# Patient Record
Sex: FEMALE | Race: WHITE | NOT HISPANIC OR LATINO | URBAN - METROPOLITAN AREA
[De-identification: names, ages, dates, MRNs, and addresses within clinical notes are randomized per-mention and may not be internally consistent; named-entity substitution may affect disease eponyms.]

---

## 2018-07-05 ENCOUNTER — EMERGENCY (EMERGENCY)
Facility: HOSPITAL | Age: 23
LOS: 0 days | Discharge: HOME | End: 2018-07-06
Attending: EMERGENCY MEDICINE | Admitting: EMERGENCY MEDICINE

## 2018-07-05 VITALS
RESPIRATION RATE: 18 BRPM | HEART RATE: 111 BPM | DIASTOLIC BLOOD PRESSURE: 78 MMHG | OXYGEN SATURATION: 99 % | TEMPERATURE: 99 F | SYSTOLIC BLOOD PRESSURE: 130 MMHG

## 2018-07-05 DIAGNOSIS — O26.899 OTHER SPECIFIED PREGNANCY RELATED CONDITIONS, UNSPECIFIED TRIMESTER: ICD-10-CM

## 2018-07-05 DIAGNOSIS — R10.31 RIGHT LOWER QUADRANT PAIN: ICD-10-CM

## 2018-07-05 DIAGNOSIS — R10.9 UNSPECIFIED ABDOMINAL PAIN: ICD-10-CM

## 2018-07-05 DIAGNOSIS — Z3A.00 WEEKS OF GESTATION OF PREGNANCY NOT SPECIFIED: ICD-10-CM

## 2018-07-05 DIAGNOSIS — N83.8 OTHER NONINFLAMMATORY DISORDERS OF OVARY, FALLOPIAN TUBE AND BROAD LIGAMENT: ICD-10-CM

## 2018-07-05 LAB
ALBUMIN SERPL ELPH-MCNC: 5.1 G/DL — SIGNIFICANT CHANGE UP (ref 3.5–5.2)
ALP SERPL-CCNC: 52 U/L — SIGNIFICANT CHANGE UP (ref 30–115)
ALT FLD-CCNC: 9 U/L — SIGNIFICANT CHANGE UP (ref 0–41)
ANION GAP SERPL CALC-SCNC: 18 MMOL/L — HIGH (ref 7–14)
APPEARANCE UR: (no result)
APTT BLD: 30.2 SEC — SIGNIFICANT CHANGE UP (ref 27–39.2)
AST SERPL-CCNC: 14 U/L — SIGNIFICANT CHANGE UP (ref 0–41)
BACTERIA # UR AUTO: (no result) /HPF
BASOPHILS # BLD AUTO: 0.03 K/UL — SIGNIFICANT CHANGE UP (ref 0–0.2)
BASOPHILS NFR BLD AUTO: 0.2 % — SIGNIFICANT CHANGE UP (ref 0–1)
BILIRUB SERPL-MCNC: 1 MG/DL — SIGNIFICANT CHANGE UP (ref 0.2–1.2)
BILIRUB UR-MCNC: NEGATIVE — SIGNIFICANT CHANGE UP
BLD GP AB SCN SERPL QL: SIGNIFICANT CHANGE UP
BUN SERPL-MCNC: 14 MG/DL — SIGNIFICANT CHANGE UP (ref 10–20)
CALCIUM SERPL-MCNC: 9.9 MG/DL — SIGNIFICANT CHANGE UP (ref 8.5–10.1)
CHLORIDE SERPL-SCNC: 102 MMOL/L — SIGNIFICANT CHANGE UP (ref 98–110)
CO2 SERPL-SCNC: 22 MMOL/L — SIGNIFICANT CHANGE UP (ref 17–32)
COLOR SPEC: YELLOW — SIGNIFICANT CHANGE UP
CREAT SERPL-MCNC: 0.6 MG/DL — LOW (ref 0.7–1.5)
DIFF PNL FLD: (no result)
EOSINOPHIL # BLD AUTO: 0.01 K/UL — SIGNIFICANT CHANGE UP (ref 0–0.7)
EOSINOPHIL NFR BLD AUTO: 0.1 % — SIGNIFICANT CHANGE UP (ref 0–8)
EPI CELLS # UR: (no result) /HPF
GLUCOSE SERPL-MCNC: 103 MG/DL — HIGH (ref 70–99)
GLUCOSE UR QL: NEGATIVE MG/DL — SIGNIFICANT CHANGE UP
HCG SERPL-ACNC: 578.7 MIU/ML — HIGH
HCT VFR BLD CALC: 37.2 % — SIGNIFICANT CHANGE UP (ref 37–47)
HGB BLD-MCNC: 12.3 G/DL — SIGNIFICANT CHANGE UP (ref 12–16)
IMM GRANULOCYTES NFR BLD AUTO: 0.4 % — HIGH (ref 0.1–0.3)
INR BLD: 1.28 RATIO — SIGNIFICANT CHANGE UP (ref 0.65–1.3)
KETONES UR-MCNC: 15
LEUKOCYTE ESTERASE UR-ACNC: (no result)
LYMPHOCYTES # BLD AUTO: 1.29 K/UL — SIGNIFICANT CHANGE UP (ref 1.2–3.4)
LYMPHOCYTES # BLD AUTO: 10.4 % — LOW (ref 20.5–51.1)
MCHC RBC-ENTMCNC: 27.8 PG — SIGNIFICANT CHANGE UP (ref 27–31)
MCHC RBC-ENTMCNC: 33.1 G/DL — SIGNIFICANT CHANGE UP (ref 32–37)
MCV RBC AUTO: 84 FL — SIGNIFICANT CHANGE UP (ref 81–99)
MONOCYTES # BLD AUTO: 0.86 K/UL — HIGH (ref 0.1–0.6)
MONOCYTES NFR BLD AUTO: 6.9 % — SIGNIFICANT CHANGE UP (ref 1.7–9.3)
NEUTROPHILS # BLD AUTO: 10.2 K/UL — HIGH (ref 1.4–6.5)
NEUTROPHILS NFR BLD AUTO: 82 % — HIGH (ref 42.2–75.2)
NITRITE UR-MCNC: POSITIVE
NRBC # BLD: 0 /100 WBCS — SIGNIFICANT CHANGE UP (ref 0–0)
PH UR: 7 — SIGNIFICANT CHANGE UP (ref 5–8)
PLATELET # BLD AUTO: 315 K/UL — SIGNIFICANT CHANGE UP (ref 130–400)
POTASSIUM SERPL-MCNC: 4.5 MMOL/L — SIGNIFICANT CHANGE UP (ref 3.5–5)
POTASSIUM SERPL-SCNC: 4.5 MMOL/L — SIGNIFICANT CHANGE UP (ref 3.5–5)
PROT SERPL-MCNC: 7.6 G/DL — SIGNIFICANT CHANGE UP (ref 6–8)
PROT UR-MCNC: NEGATIVE MG/DL — SIGNIFICANT CHANGE UP
PROTHROM AB SERPL-ACNC: 13.8 SEC — HIGH (ref 9.95–12.87)
RBC # BLD: 4.43 M/UL — SIGNIFICANT CHANGE UP (ref 4.2–5.4)
RBC # FLD: 13.1 % — SIGNIFICANT CHANGE UP (ref 11.5–14.5)
RBC CASTS # UR COMP ASSIST: SIGNIFICANT CHANGE UP /HPF
SODIUM SERPL-SCNC: 142 MMOL/L — SIGNIFICANT CHANGE UP (ref 135–146)
SP GR SPEC: 1.01 — SIGNIFICANT CHANGE UP (ref 1.01–1.03)
TYPE + AB SCN PNL BLD: SIGNIFICANT CHANGE UP
UROBILINOGEN FLD QL: 0.2 MG/DL — SIGNIFICANT CHANGE UP (ref 0.2–0.2)
WBC # BLD: 12.44 K/UL — HIGH (ref 4.8–10.8)
WBC # FLD AUTO: 12.44 K/UL — HIGH (ref 4.8–10.8)
WBC UR QL: (no result) /HPF

## 2018-07-05 RX ORDER — CEFTRIAXONE 500 MG/1
1 INJECTION, POWDER, FOR SOLUTION INTRAMUSCULAR; INTRAVENOUS ONCE
Qty: 0 | Refills: 0 | Status: DISCONTINUED | OUTPATIENT
Start: 2018-07-05 | End: 2018-07-06

## 2018-07-05 RX ORDER — SODIUM CHLORIDE 9 MG/ML
1000 INJECTION INTRAMUSCULAR; INTRAVENOUS; SUBCUTANEOUS ONCE
Qty: 0 | Refills: 0 | Status: COMPLETED | OUTPATIENT
Start: 2018-07-05 | End: 2018-07-05

## 2018-07-05 RX ADMIN — SODIUM CHLORIDE 1000 MILLILITER(S): 9 INJECTION INTRAMUSCULAR; INTRAVENOUS; SUBCUTANEOUS at 21:30

## 2018-07-05 NOTE — CONSULT NOTE ADULT - PROBLEM SELECTOR RECOMMENDATION 9
-Clinically and hemodynamically stable, likely ruptured ovarian cyst after discussion with radiology.   -Ectopic and bleeding precautions given  -Will follow up with Dr. Hunter in AM  -repeat BHCG in 48 hrs, script given  -No acute GYN intervention at this time  -Dispo per ED    Discussed with Dr. Sawyer and Dr. Hunter -Clinically and hemodynamically stable, likely ruptured ovarian cyst after discussion with radiology.   -Ectopic and bleeding precautions given  -Will follow up with Dr. Hunter in AM  -repeat BHCG in 48 hrs, script given  -No acute GYN intervention at this time  -Dispo per ED  -f/u Ucx    Discussed with Dr. Sawyer and Dr. Hunter

## 2018-07-05 NOTE — CONSULT NOTE ADULT - SUBJECTIVE AND OBJECTIVE BOX
LIT BECERRA  MRN-[id number]    HPI: 22 yo  at 4w4d by LMP 6/3 presents with episode of abdominal cramping. Pt states at 1600, she was at [employer], lifted something heavy and had R lower abdominal pain, sharp, radiating to the back for a short 3-4 hours afterwards, 4/10 at worse. Pain has completely resolved at this time. Pt states she went to urgent care prior to this for the pain, where she found out she was pregnant. Was advised to see her obgyn (Dr. Hunter) however his office was closed and decided to come here. Reports having once episode of dark brown spotting yesterday, otherwise no vaginal bleeding today. Last intercourse 5 days ago, last Bm today, normal. Denies fevers, chills, headache, chest pain, SOB, abdominal pain, N/V/D, dysuria, hematuria, vaginal bleeding or discharge. Unplanned but desired pregnancy.    OBhx primigravid.   GYNhx: Menses are regular, however last on on 6/3 was much lighter than usual, previously was 5/7. Reports taking PlanB  , after which she continued to have unprotected intercourse for 5 days after since "it was in her system anyway". Regularly follow with Dr. Hunter, last appointment 1 year ago, denies abnormal pap smears, fibroids, cysts or STIs.  PMHx denies  PSHx: tonsillectomy, achilles tendon repair  Meds: none  Allergies: none  FH: denies  Shx; occasional alcohol.    Physical Exam:  Vital Signs Last 24 Hrs  T(C): 37.2 (2018 19:21), Max: 37.2 (2018 19:21)  T(F): 99 (2018 19:21), Max: 99 (2018 19:21)  HR: 111 (2018 19:21) (111 - 111)  BP: 130/78 (2018 19:21) (130/78 - 130/78)  RR: 18 (2018 19:21) (18 - 18)  SpO2: 99% (2018 19:21) (99% - 99%)    Gen; NAD  CVS: RRR, normal S1, S2  Lungs: CTAB  Abd: soft, non tender, non distended, no R/G/R, no CVA or suprapubic tenderness, no palpable masses  LE: no edema or tenderness  Pelvic; normal external genitalia. Scant dark blood, no discharge, no lesions noted, no active bleeding. Cervix closed, no CMT, anteverted, 6w size uterus, no fundal or adnexal tenderness or masses.      cefTRIAXone   IVPB 1 Gram(s) IV Intermittent Once    Labs  A pos, bhcg 578    PT/INR - ( 2018 20:03 )   PT: 13.80 sec;   INR: 1.28 ratio         PTT - ( 2018 20:03 )  PTT:30.2 sec                        12.3   12.44 )-----------( 315      ( 2018 20:03 )             37.2   07    142  |  102  |  14  ----------------------------<  103<H>  4.5   |  22  |  0.6<L>    Ca    9.9      2018 20:03    TPro  7.6  /  Alb  5.1  /  TBili  1.0  /  DBili  x   /  AST  14  /  ALT  9   /  AlkPhos  52  07-05  Urinalysis Basic - ( 2018 20:04 )    Color: Yellow / Appearance: Cloudy / S.015 / pH: x  Gluc: x / Ketone: 15  / Bili: Negative / Urobili: 0.2 mg/dL   Blood: x / Protein: Negative mg/dL / Nitrite: Positive   Leuk Esterase: Small / RBC: 1-2 /HPF / WBC 6-10 /HPF   Sq Epi: x / Non Sq Epi: Few /HPF / Bacteria: Many /HPF    Imaging  < from: US Pelvis Complete (18 @ 21:26) >  UTERUS: Anteverted measuring 6.9 x 5.5 x 4.9, with normal echogenicity   and morphology. The endometrial echo complex measures 0.7 cm. No   intrauterine gestation is seen.    RIGHT OVARY: Right adnexal heterogeneous mass measures 2.2 x 2.3 x 1.8   cm. Peripheral Doppler flow is seen with a "ring of fire" sign. The right   ovary measures 4.0 x 3.4 x 2.2 cm.    LEFT OVARY: measures 2.7 x 1.7 x 1.4 cm, and is unremarkable. Doppler   flow is demonstrated to the left ovary.     OTHER: Pelvic free fluid is seen.    IMPRESSION:    No intrauterine pregnancy is seen. Right adnexal heterogeneous mass   measures 2.2 x 2.3 x 1.8 cm. Pelvic free fluid. Suspicious for ruptured   ectopic pregnancy versus ruptured hemorrhagic cyst.    < end of copied text > LIT BECERRA  MRN-[id number]    HPI: 24 yo  at 4w4d by LMP 6/3 presents with episode of abdominal cramping. Pt states at 1600, she was at [employer], lifted something heavy and had R lower abdominal pain, sharp, radiating to the back for a short 3-4 hours afterwards, 4/10 at worse. Pain has completely resolved at this time. Pt states she went to urgent care prior to this for the pain, where she found out she was pregnant. Was advised to see her obgyn (Dr. Hunter) however his office was closed and decided to come here. Reports having once episode of dark brown spotting yesterday, otherwise no vaginal bleeding today. Last intercourse 5 days ago, last Bm today, normal. Denies fevers, chills, headache, chest pain, SOB, abdominal pain, N/V/D, dysuria, hematuria, vaginal bleeding or discharge. Unplanned but desired pregnancy.    OBhx primigravid.   GYNhx: Menses are regular, however last on on 6/3 was much lighter than usual, previously was 5/7. Reports taking PlanB  , after which she continued to have unprotected intercourse for 5 days after since "it was in her system anyway". Regularly follow with Dr. Hunter, last appointment 1 year ago, denies abnormal pap smears, fibroids, cysts or STIs.  PMHx denies  PSHx: tonsillectomy, achilles tendon repair  Meds: none  Allergies: none  FH: denies  Shx; occasional alcohol.    Physical Exam:  Vital Signs Last 24 Hrs  T(C): 37.2 (2018 19:21), Max: 37.2 (2018 19:21)  T(F): 99 (2018 19:21), Max: 99 (2018 19:21)  HR: 111 (2018 19:21) (111 - 111)  BP: 130/78 (2018 19:21) (130/78 - 130/78)  RR: 18 (2018 19:21) (18 - 18)  SpO2: 99% (2018 19:21) (99% - 99%)    Gen; NAD  CVS: RRR, normal S1, S2  Lungs: CTAB  Abd: soft, non tender, non distended, no R/G/R, no CVA or suprapubic tenderness, no palpable masses  LE: no edema or tenderness  Pelvic; normal external genitalia. Scant dark blood, no discharge, no lesions noted, no active bleeding. Cervix closed, no CMT, anteverted, 6w size uterus, no fundal or adnexal tenderness or masses.      cefTRIAXone   IVPB 1 Gram(s) IV Intermittent Once    Labs  A pos, bhcg 578    PT/INR - ( 2018 20:03 )   PT: 13.80 sec;   INR: 1.28 ratio         PTT - ( 2018 20:03 )  PTT:30.2 sec                        12.3   12.44 )-----------( 315      ( 2018 20:03 )             37.2   07    142  |  102  |  14  ----------------------------<  103<H>  4.5   |  22  |  0.6<L>    Ca    9.9      2018 20:03    TPro  7.6  /  Alb  5.1  /  TBili  1.0  /  DBili  x   /  AST  14  /  ALT  9   /  AlkPhos  52  07-05  Urinalysis Basic - ( 2018 20:04 )    Color: Yellow / Appearance: Cloudy / S.015 / pH: x  Gluc: x / Ketone: 15  / Bili: Negative / Urobili: 0.2 mg/dL   Blood: x / Protein: Negative mg/dL / Nitrite: Positive   Leuk Esterase: Small / RBC: 1-2 /HPF / WBC 6-10 /HPF   Sq Epi: x / Non Sq Epi: Few /HPF / Bacteria: Many /HPF    Imaging  < from: US Pelvis Complete (18 @ 21:26) >  UTERUS: Anteverted measuring 6.9 x 5.5 x 4.9, with normal echogenicity   and morphology. The endometrial echo complex measures 0.7 cm. No   intrauterine gestation is seen.    RIGHT OVARY: Right adnexal heterogeneous mass measures 2.2 x 2.3 x 1.8   cm. Peripheral Doppler flow is seen with a "ring of fire" sign. The right   ovary measures 4.0 x 3.4 x 2.2 cm.    LEFT OVARY: measures 2.7 x 1.7 x 1.4 cm, and is unremarkable. Doppler   flow is demonstrated to the left ovary.     OTHER: Pelvic free fluid is seen.    IMPRESSION:    No intrauterine pregnancy is seen. Right adnexal heterogeneous mass   measures 2.2 x 2.3 x 1.8 cm. Pelvic free fluid. Suspicious for ruptured   ectopic pregnancy versus ruptured hemorrhagic cyst.    < end of copied text >    ** After discussion with resident radiologist Dr Portillo, given clinical picture, images are more likely consistent with ruptured hemorrhagic cyst than ruptured ectopic, as mass appears to be contained within the ovary and not separate structure.

## 2018-07-06 VITALS
DIASTOLIC BLOOD PRESSURE: 70 MMHG | OXYGEN SATURATION: 98 % | TEMPERATURE: 100 F | HEART RATE: 89 BPM | SYSTOLIC BLOOD PRESSURE: 110 MMHG | RESPIRATION RATE: 18 BRPM

## 2018-07-06 DIAGNOSIS — N83.209 UNSPECIFIED OVARIAN CYST, UNSPECIFIED SIDE: ICD-10-CM

## 2018-07-06 RX ORDER — CEPHALEXIN 500 MG
1 CAPSULE ORAL
Qty: 21 | Refills: 0 | OUTPATIENT
Start: 2018-07-06 | End: 2018-07-12

## 2018-07-06 NOTE — ED PROVIDER NOTE - NS ED ROS FT
Constitutional: no fever, chills, no recent weight loss, change in appetite or malaise  Eyes: no redness/discharge/pain/vision changes  ENT: no rhinorrhea/ear pain/sore throat  Cardiac: No chest pain, SOB or edema.  Respiratory: No cough or respiratory distress  GI: See HPI. No nausea, vomiting, diarrhea   : + vaginal spotting. No dysuria, frequency, urgency or hematuria  MS: no pain to back or extremities, no loss of ROM, no weakness  Neuro: No headache or weakness. No LOC.  Skin: No skin rash.  Endocrine: No history of thyroid disease or diabetes.  Except as documented in the HPI, all other systems are negative.

## 2018-07-06 NOTE — ED PROVIDER NOTE - PROGRESS NOTE DETAILS
GYN evaluated patient and discussed with Radiologist, patient most likely has ruptured ovarian cyst rather hand ruptured ectopic 2/2 patient presentation. patient is currently pain free. GYN resident also discussed with Dr Hunter (patient's gyn) and agree to have patient follow up with him later today and repeat bhcg as outpatient. encourage patient to return for worsening pain/nausea/vomiting and vaginal bleeding.

## 2018-07-06 NOTE — ED PROVIDER NOTE - PHYSICAL EXAMINATION
CONSTITUTIONAL: Well-appearing; well-nourished; in no apparent distress.   EYES: PERRL; EOM intact.   CARDIOVASCULAR: Normal S1, S2; no murmurs, rubs, or gallops.   RESPIRATORY: Normal chest excursion with respiration; breath sounds clear and equal bilaterally; no wheezes, rhonchi, or rales.  GI/: + mild RLQ/suprapubic tenderness. no rebound and guarding. negative Rovsig and Psoas signs. Normal bowel sounds; non-distended; no palpable organomegaly.   MS: No evidence of trauma or deformity. Non-tender to palpation. No scoliosis. No CVA tenderness. Normal ROM in all four extremities; non-tender to palpation; distal pulses are normal.   SKIN: Normal for age and race; warm; dry; good turgor; no apparent lesions or exudate.   NEURO/PSYCH: A & O x 4; grossly unremarkable. mood and manner are appropriate. Grooming and personal hygiene are appropriate. No apparent thoughts of harm to self or others.

## 2018-07-06 NOTE — ED PROVIDER NOTE - OBJECTIVE STATEMENT
24 yo female no sig hx present c/o right lower abdomen/suprapubic pain x 1 day. pain is cramping like. also reported vaginal spotting. as per patient, she was seen at local urgent care and was told about her pregnancy and sent to ED for further evaluation. pain is mild now. denies vaginal discharge/dysuria/frequency and urgency. LMP - 8 weeks ago but she had some like period about 4 weeks ago. report her menses is usually irregular.   Denies fever/chill/HA/dizziness/chest pain/palpitation/sob/n/v/d/ black stool/bloody stool/urinary sxs

## 2018-07-06 NOTE — ED PROVIDER NOTE - CARE PLAN
Principal Discharge DX:	Abdominal pain, RLQ Principal Discharge DX:	Ruptured ovarian cyst  Secondary Diagnosis:	Abdominal pain, RLQ  Secondary Diagnosis:	Abdominal pain in pregnancy

## 2018-07-06 NOTE — ED PROVIDER NOTE - ATTENDING CONTRIBUTION TO CARE
23y f  no pmh p/w RLQ/SP pain x 1d. Was at store earlier today and bent over to pick something up, felt a sudden sharp pain in R groin radiating to SP area. Went to outside [facility], urine checked w/+preg test, referred to ED. was not aware she was pregnant. Rpts LMP as 6/3, but states spotting only/light than usual. LMP prior to that was . Also rpts unprotected sex on , after which took Plan B. Denies f/c, dizziness, cp/sob, nvd, flank pain, urinary sx, vag discharge or rash. No current vag bleeding. ObGyn Dr. Hunter. PE: young f wdwn nad, ncat, neck supple, tachy 110s reg rhythm nl s1s2, ctab no wrr, abd soft ntnd +R groin/SP ttp rest non-ttp, no palpable masses no rgr, no cvat ext nl. a/p: abd pain in pregnancy - r/o EP - urine, labs, pelvic US, reassess.

## 2018-07-09 NOTE — CHART NOTE - NSCHARTNOTEFT_GEN_A_CORE
PGY2 Progress Note    Pt followed up with Dr. Hunter in private office. Followed up on 7/6 with repeat bhcg. Office was called on 7/9 to confirm pt had presented for appointment, and he would be assuming care of the pt. No longer needs to be followed on resident BHCG list.    Dr. Anat mena. PGY2 Progress Note (Per Dr. Scherer)    Pt followed up with Dr. Hunter in private office. Followed up on 7/6 with repeat bhcg. Office was called on 7/9 to confirm pt had presented for appointment, and he would be assuming care of the pt. No longer needs to be followed on resident BHCG list.    Dr. Anat mena.

## 2019-04-12 NOTE — CONSULT NOTE ADULT - ASSESSMENT
24 yo  at 4w4d, s/p resolved episode of abdominal pain, likely with ruptured ovarian cyst. Clinically and hemodynamically stable, unlikely ectopic pregnancy.
[Negative] : Neurological

## 2023-02-04 NOTE — CONSULT NOTE ADULT - CONSULT REASON
The patient is Stable - Low risk of patient condition declining or worsening    Shift Goals  Clinical Goals: Pt will be able to sleep comfortably overnight. Pt will feel calm throughout the shift. Pt will ambulate at least once throughout the shift.  Patient Goals: sleep comfortably  Family Goals: n/a    Progress made toward(s) clinical / shift goals:  Pt noted to be anxious overnight; given with prn Ativan as per MAR and pt seen sleeping comfortably after administration of the medication. Pt was able to ambulate to bathroom and back to at least once within the shift. Pt denies pain at this time.    Patient is not progressing towards the following goals: n/a       Abdominal cramping, first trimester pregnancy, r/o ectopic